# Patient Record
Sex: FEMALE
[De-identification: names, ages, dates, MRNs, and addresses within clinical notes are randomized per-mention and may not be internally consistent; named-entity substitution may affect disease eponyms.]

---

## 2019-09-06 PROBLEM — Z00.00 ENCOUNTER FOR PREVENTIVE HEALTH EXAMINATION: Status: ACTIVE | Noted: 2019-09-06

## 2019-09-09 ENCOUNTER — APPOINTMENT (OUTPATIENT)
Dept: ORTHOPEDIC SURGERY | Facility: CLINIC | Age: 64
End: 2019-09-09
Payer: MEDICAID

## 2019-09-09 VITALS — WEIGHT: 89 LBS | HEIGHT: 63 IN | BODY MASS INDEX: 15.77 KG/M2

## 2019-09-09 DIAGNOSIS — Z78.9 OTHER SPECIFIED HEALTH STATUS: ICD-10-CM

## 2019-09-09 DIAGNOSIS — S82.002A UNSPECIFIED FRACTURE OF LEFT PATELLA, INITIAL ENCOUNTER FOR CLOSED FRACTURE: ICD-10-CM

## 2019-09-09 DIAGNOSIS — Q68.6 DISCOID MENISCUS: ICD-10-CM

## 2019-09-09 DIAGNOSIS — S83.282A OTHER TEAR OF LATERAL MENISCUS, CURRENT INJURY, LEFT KNEE, INITIAL ENCOUNTER: ICD-10-CM

## 2019-09-09 PROCEDURE — 73560 X-RAY EXAM OF KNEE 1 OR 2: CPT | Mod: LT

## 2019-09-09 PROCEDURE — 99204 OFFICE O/P NEW MOD 45 MIN: CPT

## 2019-09-09 NOTE — DISCUSSION/SUMMARY
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 Recommend nonoperative management including:\par \par Hinged knee brace locked in extension to allow ambulation\par \par unlock knee brace for her knee range of motion with physical therapy\par \par Over the counter anti-inflammatories and Tylenol for pain\par \par Compressive wrap about the knee\par \par compressive stocking for leg swelling\par \par Elevate and ice\par \par Begin physical therapy\par \par Followup 2 weeks

## 2019-09-09 NOTE — PHYSICAL EXAM
[de-identified] : \par Physical Examination\par General: well nourished, in no acute distress, alert and oriented to person, place and time\par Psychiatric: normal mood and affect, no abnormal movements or speech patterns\par Eyes: vision intact With glasses\par Throat: no thyromegaly\par Lymph: no enlarged nodes, no lymphedema in extremity\par Respiratory: no wheezing, no shortness of breath with ambulation\par Cardiac: no cardiac leg swelling, 2+ peripheral pulses\par Neurology: normal gross sensation in extremities to light touch\par Abdomen: soft, non-tender, tympanic, no masses\par \par Musculoskeletal Examination\par Ambulation	Left antalgic gait, + crutch assistive devices\par \par Knee			Right			Left\par General\par      Swelling/Deformity	normal			mild moderate about the knee and calf and foot\par      Skin			normal			extendive ecchymosis over the forefoot, calf and posterior knee, abrasion over patella, centrally clean healing\par      Erythema		-			-\par      Standing Alignment	neutral			neutral\par      Effusion		none			moderate\par Range of Motion\par      Hip			full painless ROM		&\par      Knee Flexion		130			80 + limited\par      Knee Extension	                 0			0\par Patella\par      J Sign	                 	-			-&\par      Quad Medial/Lateral	1/1 1/1&\par      Apprehension		-			-&\par      Abelardo's	                 	-			&\par      Grind Sign		-			&\par      Crepitus		-			&\par Palpation\par      Medial Joint Line	                -			-\par      Medial Fem Condyle	-			-\par      Lateral Joint Line	-			-\par      Quad Tendon		-			-\par       Patellar Tendon                  -                                               +\par      Medial Patella		-			+\par      Lateral Patella      	-			+\par      Posterior Knee     	-			-\par tender over ball of left foot\par Ligamentous\par      Varus @ 0° / 30°	-/-			-/-&\par      Valgus @ 0° / 30°	-/-			-/-&\par      Lachman		-			&\par      Pivot Shift		-			&\par      Anterior Drawer  	-			&\par      Posterior Drawer	-			&\par Meniscus\par      Adrianna		-			&\par      Flexion Pinch		-			&\par Strength Examination/Atrophy\par      Hip Flexors 		5+			&\par      Quadriceps		5+			& able to maintain SLR w pain\par      Hamstring		5+			&\par      Tibialis Anterior	               5+			&\par      Achilles/Soleus    	5+			&\par Sensation\par      Deep Peroneal	                normal			normal\par      Superficial Peroneal 	normal			normal\par      Sural  	                 	normal			normal\par      Posterior Tibial     	normal			normal\par      Saphneous 		normal			normal\par Pulses\par      DP			2+			2+\par \par  [de-identified] : 2 views of the left knee were ordered taken and evaluated myself today and demonstrate\par There is all medial asymmetric narrowing\par Trace osteophytic lipping\par Small to moderate suprapatellar effusion\par Normal soft tissue density with soft tissue swelling about the knee\par Otherwise normal osseous bone structure with transverse minimally displaced patella fracture \par \par \par MRI Left knee from [facility] on 9-19\par My impression of the images:\par Quality of the MRI is good\par Medial Meniscus ok\par Lateral Meniscus discoid w undersurface posterior tear\par There is mild chondral loss in the tricompartments\par There is bone marrow edema in the patella with a minimally displaced transverse fracture\par LCL [Is] intact\par MCL [Is] intact\par ACL [Is] intact\par PCL [Is] intact \par Quadriceps Tendon [Is] intact\par Patella Tendon [Is] intact\par \par The Final Radiologist Impression:\par There is abnormal marrow edema involving the lower pole of the patella with underlying intra-articular fracture. The fracture demonstrate a dominant transverse component and extending to the lateral facet of the patella with articular surface incongruency. There is elongated lower pole of the patella, could be sequela from prior injury. There is mild patella baja. \par \par The quadriceps tendon is intact. There is increased intrasubstance signal with mild laxity of the infrapatellar tendon representing tendinosis.\par \par The anterior and posterior cruciate ligaments are intact. There is minimal mucoid degeneration of the anterior cruciate ligament.\par \par The medial collateral ligament is intact. Iliotibial band is intact. Lateral collateral ligament, fibular insertion of the biceps femoris tendon and the popliteus tendon are preserved.\par \par No medial meniscal tear with mild peripheral bulge. There is mild thinning of the cartilage in the medial compartment without subchondral abnormality.\par \par There is discoid lateral meniscus with development of horizontal tear at the posterior horn. There is no full-thickness cartilage defect in the lateral compartment.\par \par There is mild circumferential soft tissue swelling about the left knee. There is small to moderate suprapatellar joint effusion with mild synovitis. No distended popliteal cyst is seen.\par \par IMPRESSION: MRI of the left knee demonstrates:\par \par 1. Intra-articular fracture of the patella with a dominant transverse fracture line and extension to the lateral facet of the patella with articular surface incongruency.\par 2. Tendinosis of the infrapatellar tendon with increased intrasubstance signal and the laxity. Mild patella baja.\par 3. Discoid lateral meniscus with horizontal tear of the posterior horn.\par 4. Small to moderate left knee effusion with mild synovitis\par

## 2019-09-09 NOTE — HISTORY OF PRESENT ILLNESS
[de-identified] : CC Left knee\par \par HPI SHRADDHA SANFORD, 64 year old F RHD presents with acute onset of 2 weeks of Activity Related pain in the Anterior Left knee with injury from fall. The pain is same, and rated a 8# out of 10, described as Burning, with radiation to calf. Rest and not moving the leg makes the pain better and any bending/weight bearing activity makes the pain worse. The patient reports associated symptoms of Swelling/Numbness/Weakness, right pedal foot pain. The patient reports pain at night affecting sleep, and denies similar pain previously.\par \par The patient has tried the following treatments:\par Activity modification	+\par Ice/Compression  	               +\par Braces    		               -\par Nsaids    		               +\par Physical Therapy  	               -\par Cortisone Injection	                -\par Visco Injection		-\par Arthroscopy		 -\par \par Review of Systems is positive for the above musculoskeletal symptoms and is otherwise non-contributory for general, constitutional, psychiatric, neurologic, HEENT, cardiac, respiratory, gastrointestinal, reproductive, lymphatic, and dermatologic complaints.\par \par Consult by  Dr Nicholas Lux\par

## 2019-09-13 ENCOUNTER — APPOINTMENT (OUTPATIENT)
Dept: ORTHOPEDIC SURGERY | Facility: CLINIC | Age: 64
End: 2019-09-13

## 2019-09-23 ENCOUNTER — APPOINTMENT (OUTPATIENT)
Dept: ORTHOPEDIC SURGERY | Facility: CLINIC | Age: 64
End: 2019-09-23